# Patient Record
Sex: FEMALE | Race: ASIAN | NOT HISPANIC OR LATINO | Employment: OTHER | URBAN - METROPOLITAN AREA
[De-identification: names, ages, dates, MRNs, and addresses within clinical notes are randomized per-mention and may not be internally consistent; named-entity substitution may affect disease eponyms.]

---

## 2020-06-26 ENCOUNTER — OFFICE VISIT (OUTPATIENT)
Dept: GASTROENTEROLOGY | Facility: CLINIC | Age: 75
End: 2020-06-26
Payer: MEDICARE

## 2020-06-26 VITALS
WEIGHT: 153 LBS | DIASTOLIC BLOOD PRESSURE: 82 MMHG | TEMPERATURE: 99.1 F | SYSTOLIC BLOOD PRESSURE: 128 MMHG | HEART RATE: 80 BPM | BODY MASS INDEX: 28.89 KG/M2 | HEIGHT: 61 IN

## 2020-06-26 DIAGNOSIS — K59.1 FUNCTIONAL DIARRHEA: Primary | ICD-10-CM

## 2020-06-26 PROCEDURE — 99203 OFFICE O/P NEW LOW 30 MIN: CPT | Performed by: INTERNAL MEDICINE

## 2020-06-26 RX ORDER — CHLORAL HYDRATE 500 MG
1000 CAPSULE ORAL
COMMUNITY

## 2020-06-26 RX ORDER — SENNOSIDES 8.6 MG
1 TABLET ORAL
COMMUNITY

## 2020-06-26 RX ORDER — ZINC GLUCONATE 50 MG
50 TABLET ORAL DAILY
COMMUNITY

## 2020-06-26 RX ORDER — ACETAMINOPHEN 500 MG
500 TABLET ORAL EVERY 6 HOURS PRN
COMMUNITY

## 2020-06-26 RX ORDER — LOPERAMIDE HYDROCHLORIDE 2 MG/1
2 CAPSULE ORAL 4 TIMES DAILY PRN
COMMUNITY

## 2020-06-26 RX ORDER — PREDNISONE 10 MG/1
TABLET ORAL
COMMUNITY
Start: 2020-06-16

## 2020-06-26 RX ORDER — TRAMADOL HYDROCHLORIDE 50 MG/1
50 TABLET ORAL EVERY 6 HOURS PRN
COMMUNITY
Start: 2020-06-16

## 2020-06-26 RX ORDER — MULTIVIT-MIN/IRON/FOLIC ACID/K 18-600-40
2000 CAPSULE ORAL
COMMUNITY

## 2020-06-26 RX ORDER — MULTIVITAMIN WITH IRON
100 TABLET ORAL
COMMUNITY

## 2020-06-26 RX ORDER — NIACIN 100 MG
100 TABLET ORAL
COMMUNITY

## 2020-06-26 RX ORDER — MECLIZINE HYDROCHLORIDE 25 MG/1
25 TABLET ORAL EVERY 8 HOURS
COMMUNITY
Start: 2020-06-11

## 2020-06-26 RX ORDER — GABAPENTIN 100 MG/1
100 CAPSULE ORAL EVERY EVENING
COMMUNITY
Start: 2020-04-12

## 2020-08-06 ENCOUNTER — TELEPHONE (OUTPATIENT)
Dept: GASTROENTEROLOGY | Facility: CLINIC | Age: 75
End: 2020-08-06

## 2020-08-06 DIAGNOSIS — K59.1 FUNCTIONAL DIARRHEA: Primary | ICD-10-CM

## 2020-08-24 RX ORDER — FLUOROURACIL 50 MG/G
CREAM TOPICAL
COMMUNITY
Start: 2020-07-28

## 2020-08-25 ENCOUNTER — OFFICE VISIT (OUTPATIENT)
Dept: GASTROENTEROLOGY | Facility: CLINIC | Age: 75
End: 2020-08-25
Payer: MEDICARE

## 2020-08-25 VITALS
HEART RATE: 88 BPM | WEIGHT: 148.6 LBS | SYSTOLIC BLOOD PRESSURE: 138 MMHG | HEIGHT: 61 IN | DIASTOLIC BLOOD PRESSURE: 88 MMHG | BODY MASS INDEX: 28.05 KG/M2 | TEMPERATURE: 98.1 F

## 2020-08-25 DIAGNOSIS — K66.0 ABDOMINAL ADHESIONS: ICD-10-CM

## 2020-08-25 DIAGNOSIS — R19.7 DIARRHEA, UNSPECIFIED TYPE: Primary | ICD-10-CM

## 2020-08-25 DIAGNOSIS — R10.84 GENERALIZED ABDOMINAL PAIN: ICD-10-CM

## 2020-08-25 PROCEDURE — 99213 OFFICE O/P EST LOW 20 MIN: CPT | Performed by: PHYSICIAN ASSISTANT

## 2020-08-25 RX ORDER — MONTELUKAST SODIUM 4 MG/1
1 TABLET, CHEWABLE ORAL 2 TIMES DAILY
Qty: 60 TABLET | Refills: 1 | Status: SHIPPED | OUTPATIENT
Start: 2020-08-25 | End: 2020-08-26 | Stop reason: SDUPTHER

## 2020-08-25 RX ORDER — PHENOBARBITAL, HYOSCYAMINE SULFATE, ATROPINE SULFATE AND SCOPOLAMINE HYDROBROMIDE .0194; .1037; 16.2; .0065 MG/1; MG/1; MG/1; MG/1
1 TABLET ORAL EVERY 6 HOURS PRN
Qty: 60 TABLET | Refills: 2 | Status: SHIPPED | OUTPATIENT
Start: 2020-08-25 | End: 2020-08-26 | Stop reason: SDUPTHER

## 2020-08-25 RX ORDER — NITROFURANTOIN 25; 75 MG/1; MG/1
100 CAPSULE ORAL 2 TIMES DAILY
COMMUNITY
Start: 2020-08-14

## 2020-08-25 NOTE — PROGRESS NOTES
[facility] - Outpatient Follow-up Note  Tri Graf 66 y o  female MRN: [id number]  Encounter: [id number]          ASSESSMENT AND PLAN:      1  Diarrhea, unspecified type  2  Generalized abdominal pain  3  Abdominal adhesions    Imodium had been helping but has lost its effectiveness  She has a long history of recurrent and extensive abdominal adhesions and she is requesting surgical referral for this as she is sure this is the source of her current symptoms  She understands that with every abdominal surgery more adhesions form and the risk of complications and SBO increases  Dicyclomine is not helping her pain  Will use Colestid BID and Donnatol prn    Amitriptyline has been recommended to her in the past but she has refused this    Will plan abdominal imaging    Will refer to surgery at her request    ______________________________________________________________________    SUBJECTIVE:  47-year-old female presents for follow-up of diarrhea, abdominal pain and nausea  The symptoms started in March of this year  She saw her family doctor who ordered stool testing and lab work which was reported as normal   She was then referred to follow up with Dr Sabrina Rios  She saw Dr Sabrina Rios several months ago at which point Imodium with seeming to help control the symptom  Unfortunately after that appointment the Imodium lost its effectiveness  She is currently reporting daily issues with multiple loose stools as well as severe cramping and stabbing abdominal pain  She has constant nausea which is making it difficult for her to tolerate a diet  Despite this she is actually gaining weight  She has a 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 and has required multiple surgeries for this  She reports that she is sure this is the source of her symptoms and really does not want any other medications but does want a referral for surgical intervention    She currently denies any rectal bleeding, hematemesis, melena, fevers, chills or unexpected weight loss  REVIEW OF SYSTEMS IS OTHERWISE NEGATIVE  Historical Information   History reviewed  No pertinent past medical history  Past Surgical History:   Procedure Laterality Date    BRAIN SURGERY      2010 and 2018   Dali Santoyo BRAIN TUMOR EXCISION       Social History   Social History     Substance and Sexual Activity   Alcohol Use Yes     Social History     Substance and Sexual Activity   Drug Use Never     Social History     Tobacco Use   Smoking Status Never Smoker   Smokeless Tobacco Never Used     History reviewed  No pertinent family history      Meds/Allergies       Current Outpatient Medications:     acetaminophen (TYLENOL) 500 mg tablet    Cholecalciferol (VITAMIN D) 50 MCG (2000 UT) CAPS    Coenzyme Q10 (COQ-10) 400 MG CAPS    fluorouracil (EFUDEX) 5 % cream    gabapentin (NEURONTIN) 100 mg capsule    loperamide (IMODIUM) 2 mg capsule    meclizine (ANTIVERT) 25 mg tablet    niacin 100 mg tablet    nitrofurantoin (MACROBID) 100 mg capsule    Omega-3 Fatty Acids (FISH OIL) 1,000 mg    predniSONE 10 mg tablet    pyridoxine (VITAMIN B6) 100 mg tablet    traMADol (ULTRAM) 50 mg tablet    zinc gluconate 50 mg tablet    colestipol (COLESTID) 1 g tablet    PHENobarbital-hyoscyamine-atropine-scopolamine (DONNATAL) 16 2 mg TABS    Allergies   Allergen Reactions    Codeine      Other reaction(s): Nausea and/or vomiting, Nausea/vomiting, Unknown Reaction    Duloxetine      Jerking of hands and feet  Other reaction(s): dizziness  Jerking of hands and feet      Epinephrine      Jerking so bad almost like a seizure  Jerking so bad almost like a seizure      Morphine      Doesn't work  Doesn't work      Oxycodone-Acetaminophen      Other reaction(s): Nausea and vomiting, Nausea/vomiting    Pregabalin GI Intolerance     Other reaction(s): Unknown Reaction  Jerking of head and feet  Jerking of head and feet  Head tremors      Statins Myalgia     Other reaction(s): Unknown Reaction  Other reaction(s): Muscle and/or joint pain  Other reaction(s): Muscle and/or joint pain      Aspirin GI Intolerance     Severe stomach upset    Crab Extract Allergy Skin Test Hives    Dronabinol      Other reaction(s): Nausea/vomiting, Other (Please comment)  Sense of falling    Duloxetine Hcl Sneezing     tremors - hands and feet      Erythromycin      Other reaction(s): Other (Please comment)  Severe stomach upset  Cannot tolerate any "mycins"    Hydrochlorothiazide W-Triamterene      Cramps    Lovastatin      Other reaction(s): Unknown Reaction    Metaxalone      Other reaction(s): Unknown Reaction    Pentazocine      Other reaction(s): Unknown Reaction    Pravastatin      Other reaction(s): Unknown Reaction           Objective     Blood pressure 138/88, pulse 88, temperature 98 1 °F (36 7 °C), height 5' 1" (1 549 m), weight 67 4 kg (148 lb 9 6 oz)  Body mass index is 28 08 kg/m²  PHYSICAL EXAM:      General Appearance:   Alert, cooperative, no distress   HEENT:   Normocephalic, atraumatic, anicteric      Neck:  Supple, symmetrical, trachea midline   Lungs:   Clear to auscultation bilaterally; no rales, rhonchi or wheezing; respirations unlabored    Heart[de-identified]   Regular rate and rhythm; no murmur, rub, or gallop  Abdomen:   Soft, non-tender, non-distended; normal bowel sounds; no masses, no organomegaly    Genitalia:   Deferred    Rectal:   Deferred    Extremities:  No cyanosis, clubbing or edema    Pulses:  2+ and symmetric    Skin:  No jaundice, rashes, or lesions    Lymph nodes:  No palpable cervical lymphadenopathy        Lab Results:   No visits with results within 1 Day(s) from this visit  Latest known visit with results is:   No results found for any previous visit  Radiology Results:   No results found

## 2020-08-26 DIAGNOSIS — R10.84 GENERALIZED ABDOMINAL PAIN: ICD-10-CM

## 2020-08-26 DIAGNOSIS — R19.7 DIARRHEA, UNSPECIFIED TYPE: ICD-10-CM

## 2020-08-26 RX ORDER — MONTELUKAST SODIUM 4 MG/1
1 TABLET, CHEWABLE ORAL 2 TIMES DAILY
Qty: 60 TABLET | Refills: 1 | Status: SHIPPED | OUTPATIENT
Start: 2020-08-26

## 2020-08-26 RX ORDER — PHENOBARBITAL, HYOSCYAMINE SULFATE, ATROPINE SULFATE AND SCOPOLAMINE HYDROBROMIDE .0194; .1037; 16.2; .0065 MG/1; MG/1; MG/1; MG/1
1 TABLET ORAL EVERY 6 HOURS PRN
Qty: 60 TABLET | Refills: 2 | Status: SHIPPED | OUTPATIENT
Start: 2020-08-26 | End: 2021-03-11 | Stop reason: SDUPTHER

## 2020-08-26 NOTE — TELEPHONE ENCOUNTER
Vinny Mullins pt - Pt states we sent her medications to the wrong pharmacy yesterday  Please resend to [facility] on [address] in [place], [place]   Ty

## 2020-09-09 ENCOUNTER — CONSULT (OUTPATIENT)
Dept: SURGERY | Facility: CLINIC | Age: 75
End: 2020-09-09
Payer: MEDICARE

## 2020-09-09 VITALS
SYSTOLIC BLOOD PRESSURE: 138 MMHG | BODY MASS INDEX: 27.94 KG/M2 | TEMPERATURE: 98 F | WEIGHT: 148 LBS | HEIGHT: 61 IN | HEART RATE: 83 BPM | DIASTOLIC BLOOD PRESSURE: 80 MMHG

## 2020-09-09 DIAGNOSIS — R10.31 RIGHT LOWER QUADRANT ABDOMINAL PAIN: ICD-10-CM

## 2020-09-09 DIAGNOSIS — R19.7 DIARRHEA, UNSPECIFIED TYPE: Primary | ICD-10-CM

## 2020-09-09 PROCEDURE — 99204 OFFICE O/P NEW MOD 45 MIN: CPT | Performed by: STUDENT IN AN ORGANIZED HEALTH CARE EDUCATION/TRAINING PROGRAM

## 2020-09-09 NOTE — PROGRESS NOTES
Assessment/Plan:  38-year-old female with right lower quadrant abdominal pain  -patient with a history of chronic abdominal pain  -most recently she was lifting something had pain in her right lower quadrant since March has been associated with diarrhea  -patient is concerned to be from prior abdominal adhesions  -patient has an MRI enterography ordered by GI  -will see patient in office after her MRI is done     1  Right lower quadrant abdominal pain  -     Ambulatory referral to General Surgery               Subjective: Abdominal Adhesions     Patient ID: Emmanuel Real is a 66 y o  female  Triage Notes:    Patient is a 38-year-old female with an extensive abdominal surgical history who presents to the office for evaluation of right lower quadrant abdominal pain and diarrhea  Patient states in March of this year she lifted something heavy and felt something pull internally  Since that time she has had diarrhea multiple times a day and abdominal cramping mostly in the morning and it is also associated with diarrhea  She takes tramadol daily for the abdominal pain along with Imodium for the diarrhea  Patient has a history of abdominal surgeries for hysterectomy and oophorectomy along with lysis of adhesions  She states she is able to tolerate a diet though she does not feel as hungry as normal   Patient has a history of multiple brain meningiomas which are excised when they become symptomatic  The following portions of the patient's history were reviewed and updated as appropriate:   She  has no past medical history on file  She There are no active problems to display for this patient  She  has a past surgical history that includes Brain surgery and Brain tumor excision  Her family history is not on file  She  reports that she has never smoked  She has never used smokeless tobacco  She reports current alcohol use  She reports that she does not use drugs    Current Outpatient Medications on File Prior to Visit   Medication Sig    acetaminophen (TYLENOL) 500 mg tablet Take 500 mg by mouth every 6 (six) hours as needed    Cholecalciferol (VITAMIN D) 50 MCG (2000 UT) CAPS Take 2,000 Units by mouth    Coenzyme Q10 (COQ-10) 400 MG CAPS Take 1 capsule by mouth    colestipol (COLESTID) 1 g tablet Take 1 tablet (1 g total) by mouth 2 (two) times a day    fluorouracil (EFUDEX) 5 % cream     gabapentin (NEURONTIN) 100 mg capsule Take 100 mg by mouth every evening    loperamide (IMODIUM) 2 mg capsule Take 2 mg by mouth 4 (four) times a day as needed    meclizine (ANTIVERT) 25 mg tablet Take 25 mg by mouth every 8 (eight) hours    niacin 100 mg tablet Take 100 mg by mouth    nitrofurantoin (MACROBID) 100 mg capsule Take 100 mg by mouth 2 (two) times a day    Omega-3 Fatty Acids (FISH OIL) 1,000 mg Take 1,000 mg by mouth    PHENobarbital-hyoscyamine-atropine-scopolamine (DONNATAL) 16 2 mg TABS Take 1 tablet by mouth every 6 (six) hours as needed (abdominal pain)    predniSONE 10 mg tablet take 1 tablet by mouth once daily for 14 days    pyridoxine (VITAMIN B6) 100 mg tablet Take 100 mg by mouth    traMADol (ULTRAM) 50 mg tablet Take 50 mg by mouth every 6 (six) hours as needed    zinc gluconate 50 mg tablet Take 50 mg by mouth daily     No current facility-administered medications on file prior to visit  She is allergic to codeine; duloxetine; epinephrine; morphine; oxycodone-acetaminophen; pregabalin; statins; aspirin; crab extract allergy skin test; dronabinol; duloxetine hcl; erythromycin; hydrochlorothiazide w-triamterene; lovastatin; metaxalone; pentazocine; and pravastatin       Review of Systems   Constitutional: Negative for chills, fatigue and fever  HENT: Negative for congestion, hearing loss, rhinorrhea and sore throat  Eyes: Negative for pain and discharge  Respiratory: Negative for cough, chest tightness and shortness of breath      Cardiovascular: Negative for chest pain and palpitations  Gastrointestinal: Positive for abdominal pain and diarrhea  Negative for constipation, nausea and vomiting  Endocrine: Negative for cold intolerance and heat intolerance  Genitourinary: Negative for difficulty urinating and dysuria  Musculoskeletal: Negative for back pain and neck pain  Skin: Negative for color change and rash  Allergic/Immunologic: Negative for environmental allergies and food allergies  Neurological: Negative for seizures and headaches  Hematological: Negative for adenopathy  Does not bruise/bleed easily  Psychiatric/Behavioral: Negative for confusion and hallucinations  Objective:      /80   Pulse 83   Temp 98 °F (36 7 °C) (Temporal)   Ht 5' 1" (1 549 m)   Wt 67 1 kg (148 lb)   Breastfeeding No   BMI 27 96 kg/m²     Below is the patient's most recent value for Albumin, ALT, AST, BUN, Calcium, Chloride, Cholesterol, CO2, Creatinine, GFR, Glucose, HDL, Hematocrit, Hemoglobin, Hemoglobin A1C, LDL, Magnesium, Phosphorus, Platelets, Potassium, PSA, Sodium, Triglycerides, and WBC  No results found for: ALT, AST, BUN, CALCIUM, CL, CHOL, CO2, CREATININE, CREAT, GFRAA, GFRNONAA, HDL, HCT, HGB, HGBA1C, LDL, MG, PHOS, PLT, K, PSA, NA, TRIG, WBC  Note: for a comprehensive list of the patient's lab results, access the Results Review activity  Physical Exam  Constitutional:       General: She is not in acute distress  Appearance: Normal appearance  HENT:      Head: Normocephalic and atraumatic  Nose: Nose normal    Eyes:      General: No scleral icterus  Conjunctiva/sclera: Conjunctivae normal    Neck:      Musculoskeletal: Neck supple  Cardiovascular:      Rate and Rhythm: Normal rate and regular rhythm  Pulses: Normal pulses  Heart sounds: Normal heart sounds  Pulmonary:      Effort: Pulmonary effort is normal       Breath sounds: Normal breath sounds  Abdominal:      General: There is no distension        Palpations: Abdomen is soft  There is no mass  Hernia: No hernia is present  Comments: Right lower quadrant tenderness   Musculoskeletal:         General: No signs of injury  Skin:     General: Skin is warm  Coloration: Skin is not jaundiced  Neurological:      General: No focal deficit present  Mental Status: She is alert and oriented to person, place, and time     Psychiatric:         Mood and Affect: Mood normal

## 2020-09-09 NOTE — LETTER
September 9, 2020     [address] [facility]  [address]  [facility]    Patient: Harleen Calvo   YOB: 1945   Date of Visit: 9/9/2020       Dear Dr Tristen Eastman: Thank you for referring Neno Lopez to me for evaluation  Below are my notes for this consultation  If you have questions, please do not hesitate to call me  I look forward to following your patient along with you           Sincerely,        Lara Murphy DO        CC: Dilia Astorga DO

## 2020-10-14 ENCOUNTER — HOSPITAL ENCOUNTER (OUTPATIENT)
Dept: MRI IMAGING | Facility: HOSPITAL | Age: 75
Discharge: HOME | End: 2020-10-14
Payer: MEDICARE

## 2020-10-14 DIAGNOSIS — R19.7 DIARRHEA, UNSPECIFIED TYPE: ICD-10-CM

## 2020-10-14 PROCEDURE — G1004 CDSM NDSC: HCPCS

## 2020-10-14 PROCEDURE — 74183 MRI ABD W/O CNTR FLWD CNTR: CPT

## 2020-10-14 PROCEDURE — 72197 MRI PELVIS W/O & W/DYE: CPT

## 2020-10-14 PROCEDURE — A9585 GADOBUTROL INJECTION: HCPCS | Performed by: PHYSICIAN ASSISTANT

## 2020-10-14 RX ADMIN — GADOBUTROL 6 ML: 604.72 INJECTION INTRAVENOUS at 12:21

## 2020-10-14 RX ADMIN — GLUCAGON HYDROCHLORIDE 1 MG: KIT at 12:05

## 2020-10-19 ENCOUNTER — TELEPHONE (OUTPATIENT)
Dept: GASTROENTEROLOGY | Facility: CLINIC | Age: 75
End: 2020-10-19

## 2020-10-20 ENCOUNTER — OFFICE VISIT (OUTPATIENT)
Dept: SURGERY | Facility: CLINIC | Age: 75
End: 2020-10-20
Payer: MEDICARE

## 2020-10-20 VITALS
HEIGHT: 61 IN | TEMPERATURE: 97.5 F | HEART RATE: 88 BPM | SYSTOLIC BLOOD PRESSURE: 142 MMHG | BODY MASS INDEX: 28.21 KG/M2 | WEIGHT: 149.4 LBS | DIASTOLIC BLOOD PRESSURE: 82 MMHG

## 2020-10-20 DIAGNOSIS — R10.31 RIGHT LOWER QUADRANT ABDOMINAL PAIN: Primary | ICD-10-CM

## 2020-10-20 PROCEDURE — 99213 OFFICE O/P EST LOW 20 MIN: CPT | Performed by: STUDENT IN AN ORGANIZED HEALTH CARE EDUCATION/TRAINING PROGRAM

## 2020-10-20 RX ORDER — DIAZEPAM 5 MG/1
TABLET ORAL
COMMUNITY

## 2020-10-20 RX ORDER — DICYCLOMINE HCL 20 MG
TABLET ORAL
COMMUNITY

## 2020-10-20 RX ORDER — A/SINGAPORE/GP1908/2015 IVR-180 (AN A/MICHIGAN/45/2015 (H1N1)PDM09-LIKE VIRUS, A/HONG KONG/4801/2014, NYMC X-263B (H3N2) (AN A/HONG KONG/4801/2014-LIKE VIRUS), AND B/BRISBANE/60/2008, WILD TYPE (A B/BRISBANE/60/2008-LIKE VIRUS) 15; 15; 15 UG/.5ML; UG/.5ML; UG/.5ML
INJECTION, SUSPENSION INTRAMUSCULAR
COMMUNITY
Start: 2020-09-10

## 2020-11-23 ENCOUNTER — OFFICE VISIT (OUTPATIENT)
Dept: GASTROENTEROLOGY | Facility: CLINIC | Age: 75
End: 2020-11-23
Payer: MEDICARE

## 2020-11-23 VITALS
DIASTOLIC BLOOD PRESSURE: 92 MMHG | HEIGHT: 61 IN | SYSTOLIC BLOOD PRESSURE: 148 MMHG | WEIGHT: 149.2 LBS | BODY MASS INDEX: 28.17 KG/M2 | HEART RATE: 72 BPM

## 2020-11-23 DIAGNOSIS — R10.84 GENERALIZED ABDOMINAL PAIN: Primary | ICD-10-CM

## 2020-11-23 DIAGNOSIS — R19.7 DIARRHEA, UNSPECIFIED TYPE: ICD-10-CM

## 2020-11-23 DIAGNOSIS — R14.0 BLOATING: ICD-10-CM

## 2020-11-23 PROCEDURE — 99214 OFFICE O/P EST MOD 30 MIN: CPT | Performed by: INTERNAL MEDICINE

## 2021-03-11 ENCOUNTER — OFFICE VISIT (OUTPATIENT)
Dept: GASTROENTEROLOGY | Facility: CLINIC | Age: 76
End: 2021-03-11
Payer: MEDICARE

## 2021-03-11 VITALS
BODY MASS INDEX: 28.85 KG/M2 | WEIGHT: 152.8 LBS | SYSTOLIC BLOOD PRESSURE: 128 MMHG | DIASTOLIC BLOOD PRESSURE: 80 MMHG | HEART RATE: 68 BPM | HEIGHT: 61 IN

## 2021-03-11 DIAGNOSIS — R11.0 NAUSEA: Primary | ICD-10-CM

## 2021-03-11 DIAGNOSIS — R19.7 DIARRHEA, UNSPECIFIED TYPE: ICD-10-CM

## 2021-03-11 DIAGNOSIS — R10.84 GENERALIZED ABDOMINAL PAIN: ICD-10-CM

## 2021-03-11 PROCEDURE — 99213 OFFICE O/P EST LOW 20 MIN: CPT | Performed by: PHYSICIAN ASSISTANT

## 2021-03-11 RX ORDER — PHENOBARBITAL, HYOSCYAMINE SULFATE, ATROPINE SULFATE AND SCOPOLAMINE HYDROBROMIDE .0194; .1037; 16.2; .0065 MG/1; MG/1; MG/1; MG/1
1 TABLET ORAL EVERY 6 HOURS PRN
Qty: 60 TABLET | Refills: 2 | Status: SHIPPED | OUTPATIENT
Start: 2021-03-11

## 2021-03-11 RX ORDER — PHENOBARBITAL 16.2 MG/1
TABLET ORAL
COMMUNITY
Start: 2021-02-10

## 2021-03-11 NOTE — PATIENT INSTRUCTIONS
Abdominal Pain   WHAT YOU NEED TO KNOW:   Abdominal pain can be dull, achy, or sharp  You may have pain in one area of your abdomen, or in your entire abdomen  Your pain may be caused by a condition such as constipation, food sensitivity or poisoning, infection, or a blockage  Abdominal pain can also be from a hernia, appendicitis, or an ulcer  Liver, gallbladder, or kidney conditions can also cause abdominal pain  The cause of your abdominal pain may be unknown  DISCHARGE INSTRUCTIONS:   Return to the emergency department if:   · You have new chest pain or shortness of breath  · You have pulsing pain in your upper abdomen or lower back that suddenly becomes constant  · Your pain is in the right lower abdominal area and worsens with movement  · You have a fever over 100 4°F (38°C) or shaking chills  · You are vomiting and cannot keep food or liquids down  · Your pain does not improve or gets worse over the next 8 to 12 hours  · You see blood in your vomit or bowel movements, or they look black and tarry  · Your skin or the whites of your eyes turn yellow  · You are a woman and have a large amount of vaginal bleeding that is not your monthly period  Contact your healthcare provider if:   · You have pain in your lower back  · You are a man and have pain in your testicles  · You have pain when you urinate  · You have questions or concerns about your condition or care  Follow up with your healthcare provider within 24 hours or as directed:  Write down your questions so you remember to ask them during your visits  Medicines:   · Medicines  may be given to calm your stomach and prevent vomiting or to decrease pain  Ask how to take pain medicine safely  · Take your medicine as directed  Contact your healthcare provider if you think your medicine is not helping or if you have side effects  Tell him of her if you are allergic to any medicine   Keep a list of the medicines, vitamins, and herbs you take  Include the amounts, and when and why you take them  Bring the list or the pill bottles to follow-up visits  Carry your medicine list with you in case of an emergency  © Copyright [address] Information is for End User's use only and may not be sold, redistributed or otherwise used for commercial purposes  All illustrations and images included in CareNotes® are the copyrighted property of A D A M , Inc  or [facility] Zachery Kaplan   The above information is an  only  It is not intended as medical advice for individual conditions or treatments  Talk to your doctor, nurse or pharmacist before following any medical regimen to see if it is safe and effective for you

## 2021-03-11 NOTE — PROGRESS NOTES
[facility] - Outpatient Follow-up Note  Xiao Verma 64 y o  female MRN: [id number]  Encounter: [id number]          ASSESSMENT AND PLAN:      1  Generalized abdominal pain  2  Nausea  3  Diarrhea, unspecified type  -Will refill Donnatal   I have asked patient to think about having repeat endoscopic evaluation or repeat CT scan with contrast at this time  At present patient does not want to proceed with any endoscopic evaluation or imaging of her abdomen  She will call the office if symptoms worsen  I offered her a prescription for Zofran and she denied taking this as well   ______________________________________________________________________    SUBJECTIVE:    55-year-old female presents to the office today for follow-up of abdominal pain and diarrhea  Patient reports that the only thing that really seems to help her pain is the Donnatal that she has been taking  Patient is still reporting chronic nausea  Patient reports that her diarrhea tends to come and spirits  She reports that her appetite has decreased  Patient wishes to have no further medication or testing done at this point in time  Looking back it looks like her last endoscopic evaluation was in 2011  I have asked the patient if she would be willing to get reimaged or have repeat endoscopic evaluation done at this time and she wishes to hold off on this  REVIEW OF SYSTEMS IS OTHERWISE NEGATIVE  Historical Information   Past Medical History:   Diagnosis Date    Meningiomas, multiple ([address] )      Past Surgical History:   Procedure Laterality Date    BRAIN SURGERY      2010 and 2018    BRAIN TUMOR EXCISION       Social History   Social History     Substance and Sexual Activity   Alcohol Use Yes     Social History     Substance and Sexual Activity   Drug Use Never     Social History     Tobacco Use   Smoking Status Never Smoker   Smokeless Tobacco Never Used     History reviewed   No pertinent family history  Meds/Allergies       Current Outpatient Medications:     acetaminophen (TYLENOL) 500 mg tablet    Cholecalciferol (VITAMIN D) 50 MCG (2000 UT) CAPS    Coenzyme Q10 (COQ-10) 400 MG CAPS    colestipol (COLESTID) 1 g tablet    diazepam (VALIUM) 5 mg tablet    Diclofenac Sodium (VOLTAREN) 1 %    dicyclomine (BENTYL) 20 mg tablet    Fluad Quadrivalent 0 5 ML PRSY    fluorouracil (EFUDEX) 5 % cream    gabapentin (NEURONTIN) 100 mg capsule    loperamide (IMODIUM) 2 mg capsule    meclizine (ANTIVERT) 25 mg tablet    niacin 100 mg tablet    nitrofurantoin (MACROBID) 100 mg capsule    Omega-3 Fatty Acids (FISH OIL) 1,000 mg    PHENobarbital 16 2 mg tablet    PHENobarbital-hyoscyamine-atropine-scopolamine (DONNATAL) 16 2 mg TABS    predniSONE 10 mg tablet    pyridoxine (VITAMIN B6) 100 mg tablet    traMADol (ULTRAM) 50 mg tablet    zinc gluconate 50 mg tablet    Allergies   Allergen Reactions    Codeine      Other reaction(s): Nausea and/or vomiting, Nausea/vomiting, Unknown Reaction    Duloxetine      Jerking of hands and feet  Other reaction(s): dizziness  Jerking of hands and feet      Epinephrine      Jerking so bad almost like a seizure  Jerking so bad almost like a seizure      Morphine      Doesn't work  Doesn't work      Oxycodone-Acetaminophen      Other reaction(s): Nausea and vomiting, Nausea/vomiting    Pregabalin GI Intolerance     Other reaction(s): Unknown Reaction  Jerking of head and feet  Jerking of head and feet  Head tremors      Statins Myalgia     Other reaction(s): Unknown Reaction  Other reaction(s): Muscle and/or joint pain  Other reaction(s): Muscle and/or joint pain      Aspirin GI Intolerance     Severe stomach upset    Crab Extract Allergy Skin Test Hives    Dronabinol      Other reaction(s): Nausea/vomiting, Other (Please comment)  Sense of falling    Duloxetine Hcl Sneezing     tremors - hands and feet      Erythromycin      Other reaction(s):  Other (Please comment)  Severe stomach upset  Cannot tolerate any "mycins"    Hydrochlorothiazide W-Triamterene      Cramps    Lovastatin      Other reaction(s): Unknown Reaction    Marijuana (Cannabis Sativa)     Metaxalone      Other reaction(s): Unknown Reaction    Pentazocine      Other reaction(s): Unknown Reaction    Pravastatin      Other reaction(s): Unknown Reaction           Objective     Blood pressure 128/80, pulse 68, height 5' 1" (1 549 m), weight 69 3 kg (152 lb 12 8 oz), not currently breastfeeding  Body mass index is 28 87 kg/m²  PHYSICAL EXAM:      General Appearance:   Alert, cooperative, no distress   HEENT:   Normocephalic, atraumatic, anicteric      Neck:  Supple, symmetrical, trachea midline   Lungs:   Clear to auscultation bilaterally; no rales, rhonchi or wheezing; respirations unlabored    Heart[de-identified]   Regular rate and rhythm; no murmur, rub, or gallop  Abdomen:   Soft, non-tender, non-distended; normal bowel sounds; no masses, no organomegaly    Genitalia:   Deferred    Rectal:   Deferred    Extremities:  No cyanosis, clubbing or edema    Pulses:  2+ and symmetric    Skin:  No jaundice, rashes, or lesions    Lymph nodes:  No palpable cervical lymphadenopathy        Lab Results:   No visits with results within 1 Day(s) from this visit  Latest known visit with results is:   No results found for any previous visit  Radiology Results:   No results found

## 2021-03-11 NOTE — LETTER
March 11, 2021     [facility], [address] [address]    Patient: Denisse Milan   YOB: 1945   Date of Visit: 3/11/2021       Dear Dr Moon Marie: Thank you for referring Noelle Walter to me for evaluation  Below are my notes for this consultation  If you have questions, please do not hesitate to call me  I look forward to following your patient along with you  Sincerely,        Martinez Sanford PA-C        CC: No Recipients  Holly Simon  3/11/2021  3:45 PM  Sign when Signing Visit  [facility] - Outpatient Follow-up Note  Denisse Milan 64 y o  female MRN: [id number]  Encounter: [id number]          ASSESSMENT AND PLAN:      1  Generalized abdominal pain  2  Nausea  3  Diarrhea, unspecified type  -Will refill Donnatal   I have asked patient to think about having repeat endoscopic evaluation or repeat CT scan with contrast at this time  At present patient does not want to proceed with any endoscopic evaluation or imaging of her abdomen  She will call the office if symptoms worsen  I offered her a prescription for Zofran and she denied taking this as well   ______________________________________________________________________    SUBJECTIVE:    57-year-old female presents to the office today for follow-up of abdominal pain and diarrhea  Patient reports that the only thing that really seems to help her pain is the Donnatal that she has been taking  Patient is still reporting chronic nausea  Patient reports that her diarrhea tends to come and spirits  She reports that her appetite has decreased  Patient wishes to have no further medication or testing done at this point in time  Looking back it looks like her last endoscopic evaluation was in 2011   I have asked the patient if she would be willing to get reimaged or have repeat endoscopic evaluation done at this time and she wishes to hold off on this     REVIEW OF SYSTEMS IS OTHERWISE NEGATIVE  Historical Information   Past Medical History:   Diagnosis Date    Meningiomas, multiple ([facility] [address] )      Past Surgical History:   Procedure Laterality Date    BRAIN SURGERY      2010 and 2018    BRAIN TUMOR EXCISION       Social History   Social History     Substance and Sexual Activity   Alcohol Use Yes     Social History     Substance and Sexual Activity   Drug Use Never     Social History     Tobacco Use   Smoking Status Never Smoker   Smokeless Tobacco Never Used     History reviewed  No pertinent family history      Meds/Allergies       Current Outpatient Medications:     acetaminophen (TYLENOL) 500 mg tablet    Cholecalciferol (VITAMIN D) 50 MCG (2000 UT) CAPS    Coenzyme Q10 (COQ-10) 400 MG CAPS    colestipol (COLESTID) 1 g tablet    diazepam (VALIUM) 5 mg tablet    Diclofenac Sodium (VOLTAREN) 1 %    dicyclomine (BENTYL) 20 mg tablet    Fluad Quadrivalent 0 5 ML PRSY    fluorouracil (EFUDEX) 5 % cream    gabapentin (NEURONTIN) 100 mg capsule    loperamide (IMODIUM) 2 mg capsule    meclizine (ANTIVERT) 25 mg tablet    niacin 100 mg tablet    nitrofurantoin (MACROBID) 100 mg capsule    Omega-3 Fatty Acids (FISH OIL) 1,000 mg    PHENobarbital 16 2 mg tablet    PHENobarbital-hyoscyamine-atropine-scopolamine (DONNATAL) 16 2 mg TABS    predniSONE 10 mg tablet    pyridoxine (VITAMIN B6) 100 mg tablet    traMADol (ULTRAM) 50 mg tablet    zinc gluconate 50 mg tablet    Allergies   Allergen Reactions    Codeine      Other reaction(s): Nausea and/or vomiting, Nausea/vomiting, Unknown Reaction    Duloxetine      Jerking of hands and feet  Other reaction(s): dizziness  Jerking of hands and feet      Epinephrine      Jerking so bad almost like a seizure  Jerking so bad almost like a seizure      Morphine      Doesn't work  Doesn't work      Oxycodone-Acetaminophen      Other reaction(s): Nausea and vomiting, Nausea/vomiting    Pregabalin GI Intolerance     Other reaction(s): Unknown Reaction  Jerking of head and feet  Jerking of head and feet  Head tremors      Statins Myalgia     Other reaction(s): Unknown Reaction  Other reaction(s): Muscle and/or joint pain  Other reaction(s): Muscle and/or joint pain      Aspirin GI Intolerance     Severe stomach upset    Crab Extract Allergy Skin Test Hives    Dronabinol      Other reaction(s): Nausea/vomiting, Other (Please comment)  Sense of falling    Duloxetine Hcl Sneezing     tremors - hands and feet      Erythromycin      Other reaction(s): Other (Please comment)  Severe stomach upset  Cannot tolerate any "mycins"    Hydrochlorothiazide W-Triamterene      Cramps    Lovastatin      Other reaction(s): Unknown Reaction    Marijuana (Cannabis Sativa)     Metaxalone      Other reaction(s): Unknown Reaction    Pentazocine      Other reaction(s): Unknown Reaction    Pravastatin      Other reaction(s): Unknown Reaction           Objective     Blood pressure 128/80, pulse 68, height 5' 1" (1 549 m), weight 69 3 kg (152 lb 12 8 oz), not currently breastfeeding  Body mass index is 28 87 kg/m²  PHYSICAL EXAM:      General Appearance:   Alert, cooperative, no distress   HEENT:   Normocephalic, atraumatic, anicteric      Neck:  Supple, symmetrical, trachea midline   Lungs:   Clear to auscultation bilaterally; no rales, rhonchi or wheezing; respirations unlabored    Heart[de-identified]   Regular rate and rhythm; no murmur, rub, or gallop  Abdomen:   Soft, non-tender, non-distended; normal bowel sounds; no masses, no organomegaly    Genitalia:   Deferred    Rectal:   Deferred    Extremities:  No cyanosis, clubbing or edema    Pulses:  2+ and symmetric    Skin:  No jaundice, rashes, or lesions    Lymph nodes:  No palpable cervical lymphadenopathy        Lab Results:   No visits with results within 1 Day(s) from this visit  Latest known visit with results is:   No results found for any previous visit  Radiology Results:   No results found

## 2021-05-25 ENCOUNTER — TELEPHONE (OUTPATIENT)
Dept: GASTROENTEROLOGY | Facility: CLINIC | Age: 76
End: 2021-05-25

## 2021-05-25 DIAGNOSIS — R10.84 GENERALIZED ABDOMINAL PAIN: ICD-10-CM

## 2022-08-16 ENCOUNTER — TELEPHONE (OUTPATIENT)
Dept: GASTROENTEROLOGY | Facility: AMBULARY SURGERY CENTER | Age: 77
End: 2022-08-16

## 2022-08-16 RX ORDER — PANTOPRAZOLE SODIUM 40 MG/1
40 TABLET, DELAYED RELEASE ORAL DAILY
COMMUNITY
Start: 2022-07-08

## 2022-08-16 RX ORDER — SAW/PYGEUM/BETA/HERB/D3/B6/ZN 30 MG-25MG
10 CAPSULE ORAL
COMMUNITY

## 2022-08-16 NOTE — TELEPHONE ENCOUNTER
Patients GI provider:  Dr Villavicencio Ear    Number to return call: ( [phone]    Reason for call: Pt calling to schedule a sooner appt than September due to hernia, increasing stomach pain    Scheduled procedure/appointment date if applicable:  N/A

## 2022-08-16 NOTE — TELEPHONE ENCOUNTER
Pt calling states that [facility] sent the wrong form for the [address]. They should be sending the form to get the tier reduction.            Pt can be reached at [phone] appt 08/17 with Dr Cage Course

## 2022-08-17 ENCOUNTER — PREP FOR PROCEDURE (OUTPATIENT)
Dept: GASTROENTEROLOGY | Facility: CLINIC | Age: 77
End: 2022-08-17

## 2022-08-17 ENCOUNTER — OFFICE VISIT (OUTPATIENT)
Dept: GASTROENTEROLOGY | Facility: CLINIC | Age: 77
End: 2022-08-17
Payer: MEDICARE

## 2022-08-17 VITALS
HEART RATE: 88 BPM | WEIGHT: 157 LBS | BODY MASS INDEX: 29.64 KG/M2 | DIASTOLIC BLOOD PRESSURE: 82 MMHG | HEIGHT: 61 IN | SYSTOLIC BLOOD PRESSURE: 142 MMHG

## 2022-08-17 DIAGNOSIS — K58.0 IRRITABLE BOWEL SYNDROME WITH DIARRHEA: ICD-10-CM

## 2022-08-17 DIAGNOSIS — R10.31 RIGHT LOWER QUADRANT ABDOMINAL PAIN: ICD-10-CM

## 2022-08-17 DIAGNOSIS — R19.7 DIARRHEA, UNSPECIFIED TYPE: Primary | ICD-10-CM

## 2022-08-17 PROCEDURE — 99214 OFFICE O/P EST MOD 30 MIN: CPT | Performed by: INTERNAL MEDICINE

## 2022-08-17 RX ORDER — DICYCLOMINE HCL 20 MG
20 TABLET ORAL EVERY 6 HOURS
Qty: 93 TABLET | Refills: 6 | Status: SHIPPED | OUTPATIENT
Start: 2022-08-17

## 2022-08-17 NOTE — PATIENT INSTRUCTIONS
Scheduled date of sigmoidoscopy (as of today):11/16/22  Physician performing sigmoidoscopy:Lana  Location:[place]  Instructions reviewed with patient by:Joceline AZEVEDO  Clearances:   none